# Patient Record
Sex: MALE | URBAN - METROPOLITAN AREA
[De-identification: names, ages, dates, MRNs, and addresses within clinical notes are randomized per-mention and may not be internally consistent; named-entity substitution may affect disease eponyms.]

---

## 2021-02-05 ENCOUNTER — EMERGENCY (EMERGENCY)
Facility: HOSPITAL | Age: 31
LOS: 0 days | Discharge: HOME | End: 2021-02-06
Attending: EMERGENCY MEDICINE | Admitting: EMERGENCY MEDICINE
Payer: MEDICAID

## 2021-02-05 VITALS
HEIGHT: 70 IN | TEMPERATURE: 98 F | DIASTOLIC BLOOD PRESSURE: 58 MMHG | HEART RATE: 77 BPM | OXYGEN SATURATION: 98 % | WEIGHT: 231.49 LBS | RESPIRATION RATE: 18 BRPM | SYSTOLIC BLOOD PRESSURE: 125 MMHG

## 2021-02-05 DIAGNOSIS — S39.012A STRAIN OF MUSCLE, FASCIA AND TENDON OF LOWER BACK, INITIAL ENCOUNTER: ICD-10-CM
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
## 2021-02-05 DIAGNOSIS — M54.9 DORSALGIA, UNSPECIFIED: ICD-10-CM
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
## 2021-02-05 PROCEDURE — 99284 EMERGENCY DEPT VISIT MOD MDM: CPT

## 2021-02-06 RX ORDER — METHOCARBAMOL 500 MG/1
1000 TABLET, FILM COATED ORAL ONCE
Refills: 0 | Status: COMPLETED | OUTPATIENT
Start: 2021-02-06 | End: 2021-02-06

## 2021-02-06 RX ORDER — METHOCARBAMOL 500 MG/1
2 TABLET, FILM COATED ORAL
Qty: 28 | Refills: 0
Start: 2021-02-06 | End: 2021-02-12

## 2021-02-06 RX ADMIN — Medication 500 MILLIGRAM(S): at 02:44

## 2021-02-06 RX ADMIN — METHOCARBAMOL 1000 MILLIGRAM(S): 500 TABLET, FILM COATED ORAL at 02:44

## 2021-02-06 NOTE — ED PROVIDER NOTE - PROGRESS NOTE DETAILS
. c/w mechanical back pain.  no neuro red flags.  sx are reproducible with position. aorta/cardiac not supported.  imaging not indicated at this time.  r/b of krysta ruiz pt.    Discussed side effects from NSAIDs and muscle relaxant  Risk GI upset/gastritis/GERD from NSAIDs. May take OTC Prilosec.  Advised not to drive or operate heavy machinery while on Flexeril due to sedation risk. May take only at night if sedation occurs.

## 2021-02-06 NOTE — ED PROVIDER NOTE - PHYSICAL EXAMINATION
CONSTITUTIONAL: Well-appearing; well-nourished; in no apparent distress.   CARDIOVASCULAR: Normal S1, S2; no murmurs, rubs, or gallops.   RESPIRATORY: Normal chest excursion with respiration; breath sounds clear and equal bilaterally; no wheezes, rhonchi, or rales.  GI/: non-distended; non-tender; no palpable organomegaly.   MS: paraspinal left lumbar spasm with +left SLR; No evidence of trauma or deformity. No CVA tenderness. Normal ROM in all four extremities; non-tender to palpation; distal pulses are normal.   NEURO/PSYCH: A & O x 4; grossly unremarkable.

## 2021-02-06 NOTE — ED ADULT NURSE NOTE - NSIMPLEMENTINTERV_GEN_ALL_ED
Implemented All Universal Safety Interventions:  [place] to call system. Call bell, personal items and telephone within reach. Instruct patient to call for assistance. Room bathroom lighting operational. Non-slip footwear when patient is off stretcher. Physically safe environment: no spills, clutter or unnecessary equipment. Stretcher in lowest position, wheels locked, appropriate side rails in place.

## 2021-02-06 NOTE — ED PROVIDER NOTE - NSFOLLOWUPCLINICS_GEN_ALL_ED_FT
[facility]  Orthpedic  [address], [place]   Phone: [phone]  Fax:   Follow Up Time:     [facility] ([facility])  Rehabilitation  [facility] [address] [place] [address]  Phone: [phone]  Fax:   Follow Up Time:

## 2021-02-06 NOTE — ED PROVIDER NOTE - CLINICAL SUMMARY MEDICAL DECISION MAKING FREE TEXT BOX
30 male here for left low back pain after heavy lifting. Had screening exam, supportive care, medications and reevaluation, no acute emergent findings, symptoms improved, plan discussed with patient, will discharge with outpatient management and return and follow up instructions.

## 2021-02-06 NOTE — ED PROVIDER NOTE - ATTENDING CONTRIBUTION TO CARE
I personally evaluated the patient. I reviewed the Resident’s or Physician Assistant’s note (as assigned above), and agree with the findings and plan except as documented in my note.     30 male here for low back pain after lifting at work. No other symptoms.     ROS otherwise unremarkable    PE: male in no distress. CV: pulses intact. CHEST: normal work of breathing. ABD: nondistended. SKIN: normal. EXT: SLRT reproduces pain on left side. NEURO: AAO 3 no focal deficits.     Impression: back strain    Plan: Rx supportive care and reevaluation

## 2021-02-06 NOTE — ED PROVIDER NOTE - OBJECTIVE STATEMENT
pt with no pmhx presents to ED c/o lower back pain for 2 days since trying to lift a box at work. has tried tylenol without relief. pain is sharp, nonradiating, moderate. denies exacerbating or relieving factors. Denies radiation pain/incontinence/numbness/saddle parathesia/legs weakness and difficulty on ambulation. denies fever/chill/HA/dizziness/chest pain/sob/abd pain/n/v/d/ urinary sxs

## 2021-02-06 NOTE — ED PROVIDER NOTE - PATIENT PORTAL LINK FT
You can access the FollowMyHealth Patient Portal offered by [facility] by registering at the following website: [email or web address]. By joining [facility]’s FollowMyHealth portal, you will also be able to view your health information using other applications (apps) compatible with our system.

## 2022-03-24 NOTE — ED ADULT NURSE NOTE - NSFALLRSKINDICATORS_ED_ALL_ED
"Reason for call:  Other   Patient called regarding (reason for call): appointment  Additional comments: Patient has a diagnosis of \"Lytic/destructive lesion\" please review for appropriate time frame for ortho oncology.      Phone number to reach patient:  Cell number on file:    Telephone Information:   Mobile [phone]       Best Time:  asap    Can we leave a detailed message on this number?  YES    Travel screening: Not Applicable     Chin LOONEY on 3/24/2022 at 7:10 AM  Orthopedic  Care Coordinator         " no
